# Patient Record
Sex: FEMALE | Race: BLACK OR AFRICAN AMERICAN | URBAN - METROPOLITAN AREA
[De-identification: names, ages, dates, MRNs, and addresses within clinical notes are randomized per-mention and may not be internally consistent; named-entity substitution may affect disease eponyms.]

---

## 2021-12-02 ENCOUNTER — OFFICE VISIT (OUTPATIENT)
Dept: URBAN - METROPOLITAN AREA CLINIC 17 | Facility: CLINIC | Age: 86
End: 2021-12-02
Payer: MEDICARE

## 2021-12-02 ENCOUNTER — LAB OUTSIDE AN ENCOUNTER (OUTPATIENT)
Dept: URBAN - METROPOLITAN AREA CLINIC 17 | Facility: CLINIC | Age: 86
End: 2021-12-02

## 2021-12-02 VITALS
BODY MASS INDEX: 37.73 KG/M2 | DIASTOLIC BLOOD PRESSURE: 75 MMHG | HEART RATE: 79 BPM | SYSTOLIC BLOOD PRESSURE: 147 MMHG | TEMPERATURE: 98.4 F | WEIGHT: 221 LBS | HEIGHT: 64 IN

## 2021-12-02 DIAGNOSIS — K57.30 ACQUIRED DIVERTICULOSIS OF COLON: ICD-10-CM

## 2021-12-02 DIAGNOSIS — I73.9 PAD (PERIPHERAL ARTERY DISEASE): ICD-10-CM

## 2021-12-02 DIAGNOSIS — G47.33 OSA (OBSTRUCTIVE SLEEP APNEA): ICD-10-CM

## 2021-12-02 DIAGNOSIS — I10 ESSENTIAL HYPERTENSION: ICD-10-CM

## 2021-12-02 DIAGNOSIS — R10.13 DYSPEPSIA: ICD-10-CM

## 2021-12-02 DIAGNOSIS — I50.9 HEART FAILURE, UNSPECIFIED HF CHRONICITY, UNSPECIFIED HEART FAILURE TYPE: ICD-10-CM

## 2021-12-02 DIAGNOSIS — I45.10 RBBB: ICD-10-CM

## 2021-12-02 DIAGNOSIS — R54 ADVANCED AGE: ICD-10-CM

## 2021-12-02 PROCEDURE — 99203 OFFICE O/P NEW LOW 30 MIN: CPT | Performed by: INTERNAL MEDICINE

## 2021-12-02 PROCEDURE — 99243 OFF/OP CNSLTJ NEW/EST LOW 30: CPT | Performed by: INTERNAL MEDICINE

## 2021-12-02 RX ORDER — NALOXONE HYDROCHLORIDE 1 MG/ML
INJECTION PARENTERAL
Qty: 4 | Status: ACTIVE | COMMUNITY

## 2021-12-02 RX ORDER — CEPHALEXIN 500 MG/1
CAPSULE ORAL
Qty: 40 | Status: ACTIVE | COMMUNITY

## 2021-12-02 RX ORDER — OLOPATADINE HYDROCHLORIDE 2 MG/ML
SOLUTION OPHTHALMIC
Qty: 7.5 | Status: ACTIVE | COMMUNITY

## 2021-12-02 RX ORDER — TRAZODONE HYDROCHLORIDE 50 MG/1
TABLET ORAL
Qty: 60 | Status: ACTIVE | COMMUNITY

## 2021-12-02 RX ORDER — AMLODIPINE BESYLATE 5 MG/1
TABLET ORAL
Qty: 90 | Status: ACTIVE | COMMUNITY

## 2021-12-02 RX ORDER — CELECOXIB 200 MG/1
CAPSULE ORAL
Qty: 60 | Refills: 0 | Status: ACTIVE | COMMUNITY

## 2021-12-02 RX ORDER — TRAMADOL HYDROCHLORIDE 50 MG/1
TABLET, COATED ORAL
Qty: 60 | Status: ACTIVE | COMMUNITY

## 2021-12-02 RX ORDER — VALSARTAN AND HYDROCHLOROTHIAZIDE 160; 25 MG/1; MG/1
TABLET, FILM COATED ORAL
Qty: 90 | Status: ACTIVE | COMMUNITY

## 2021-12-02 RX ORDER — BUSPIRONE HYDROCHLORIDE 10 MG/1
TABLET ORAL
Qty: 180 | Status: ACTIVE | COMMUNITY

## 2021-12-02 RX ORDER — PANTOPRAZOLE 40 MG/1
TABLET, DELAYED RELEASE ORAL
Qty: 180 | Refills: 0 | Status: ACTIVE | COMMUNITY

## 2021-12-02 RX ORDER — LATANOPROST 50 UG/ML
SOLUTION OPHTHALMIC
Qty: 7.5 | Status: ACTIVE | COMMUNITY

## 2021-12-02 RX ORDER — FUROSEMIDE 40 MG/1
TABLET ORAL
Qty: 90 | Refills: 1 | Status: ACTIVE | COMMUNITY

## 2021-12-02 RX ORDER — CLONAZEPAM 0.5 MG/1
TABLET ORAL
Qty: 60 | Refills: 1 | Status: ACTIVE | COMMUNITY

## 2021-12-02 RX ORDER — ATORVASTATIN CALCIUM 20 MG/1
TABLET, FILM COATED ORAL
Qty: 90 | Status: ACTIVE | COMMUNITY

## 2021-12-02 NOTE — HPI-TODAY'S VISIT:
12/2/21 87 yo pt of Dr Eduardo Kapadia. Referred for "spitting up". A copy of this note will be sent to him.  c/o spiiting up food after eating. "It goes down ok but then at night Im spitting up". This is worse when she eats late.  Symptoms have been ongoing for at least 20 years but worsening as she gets older.  She has a lot of burping at night when she lies down with her CPAP machine and some regurgitation  She has pantoprazole on her med list but she cannot tell me if she takes it or not. Denies heartburn.  Symptoms improve with Tums.  She has a BM qod. She denies constipation. She sometimes takes peptobismol when she has the runs if she eats something that disagrees with her. Unknown if ever smoked

## 2021-12-10 ENCOUNTER — TELEPHONE ENCOUNTER (OUTPATIENT)
Dept: URBAN - METROPOLITAN AREA SURGERY CENTER 30 | Facility: SURGERY CENTER | Age: 86
End: 2021-12-10

## 2022-01-18 ENCOUNTER — WEB ENCOUNTER (OUTPATIENT)
Dept: URBAN - METROPOLITAN AREA CLINIC 17 | Facility: CLINIC | Age: 87
End: 2022-01-18

## 2022-01-18 ENCOUNTER — LAB OUTSIDE AN ENCOUNTER (OUTPATIENT)
Dept: URBAN - METROPOLITAN AREA CLINIC 17 | Facility: CLINIC | Age: 87
End: 2022-01-18

## 2022-01-18 ENCOUNTER — OFFICE VISIT (OUTPATIENT)
Dept: URBAN - METROPOLITAN AREA CLINIC 17 | Facility: CLINIC | Age: 87
End: 2022-01-18
Payer: MEDICARE

## 2022-01-18 VITALS
HEART RATE: 89 BPM | SYSTOLIC BLOOD PRESSURE: 167 MMHG | BODY MASS INDEX: 38.07 KG/M2 | WEIGHT: 223 LBS | TEMPERATURE: 97.3 F | HEIGHT: 64 IN | DIASTOLIC BLOOD PRESSURE: 78 MMHG

## 2022-01-18 DIAGNOSIS — I10 ESSENTIAL HYPERTENSION: ICD-10-CM

## 2022-01-18 DIAGNOSIS — R19.4 CHANGE IN BOWEL HABITS: ICD-10-CM

## 2022-01-18 DIAGNOSIS — I45.10 RBBB: ICD-10-CM

## 2022-01-18 DIAGNOSIS — I73.9 PAD (PERIPHERAL ARTERY DISEASE): ICD-10-CM

## 2022-01-18 DIAGNOSIS — R10.13 DYSPEPSIA: ICD-10-CM

## 2022-01-18 DIAGNOSIS — K57.90 DIVERTICULOSIS: ICD-10-CM

## 2022-01-18 DIAGNOSIS — G47.33 OSA (OBSTRUCTIVE SLEEP APNEA): ICD-10-CM

## 2022-01-18 DIAGNOSIS — I50.9 HEART FAILURE, UNSPECIFIED HF CHRONICITY, UNSPECIFIED HEART FAILURE TYPE: ICD-10-CM

## 2022-01-18 DIAGNOSIS — R54 ADVANCED AGE: ICD-10-CM

## 2022-01-18 DIAGNOSIS — K92.1 BLOOD IN STOOL: ICD-10-CM

## 2022-01-18 PROCEDURE — 99214 OFFICE O/P EST MOD 30 MIN: CPT | Performed by: INTERNAL MEDICINE

## 2022-01-18 RX ORDER — TRAZODONE HYDROCHLORIDE 50 MG/1
TABLET ORAL
Qty: 60 | Status: ACTIVE | COMMUNITY

## 2022-01-18 RX ORDER — CEPHALEXIN 500 MG/1
CAPSULE ORAL
Qty: 40 | Status: ACTIVE | COMMUNITY

## 2022-01-18 RX ORDER — VALSARTAN AND HYDROCHLOROTHIAZIDE 160; 25 MG/1; MG/1
TABLET, FILM COATED ORAL
Qty: 90 | Status: ACTIVE | COMMUNITY

## 2022-01-18 RX ORDER — CELECOXIB 200 MG/1
CAPSULE ORAL
Qty: 60 | Refills: 0 | Status: ACTIVE | COMMUNITY

## 2022-01-18 RX ORDER — BUSPIRONE HYDROCHLORIDE 10 MG/1
TABLET ORAL
Qty: 180 | Status: ACTIVE | COMMUNITY

## 2022-01-18 RX ORDER — TRAMADOL HYDROCHLORIDE 50 MG/1
TABLET, COATED ORAL
Qty: 60 | Status: ACTIVE | COMMUNITY

## 2022-01-18 RX ORDER — OLOPATADINE HYDROCHLORIDE 2 MG/ML
SOLUTION OPHTHALMIC
Qty: 7.5 | Status: ACTIVE | COMMUNITY

## 2022-01-18 RX ORDER — PANTOPRAZOLE 40 MG/1
TABLET, DELAYED RELEASE ORAL
Qty: 180 | Refills: 0 | Status: ACTIVE | COMMUNITY

## 2022-01-18 RX ORDER — LATANOPROST 50 UG/ML
SOLUTION OPHTHALMIC
Qty: 7.5 | Status: ACTIVE | COMMUNITY

## 2022-01-18 RX ORDER — AMLODIPINE BESYLATE 5 MG/1
TABLET ORAL
Qty: 90 | Status: ACTIVE | COMMUNITY

## 2022-01-18 RX ORDER — ATORVASTATIN CALCIUM 20 MG/1
TABLET, FILM COATED ORAL
Qty: 90 | Status: ACTIVE | COMMUNITY

## 2022-01-18 RX ORDER — CLONAZEPAM 0.5 MG/1
TABLET ORAL
Qty: 60 | Refills: 1 | Status: ACTIVE | COMMUNITY

## 2022-01-18 RX ORDER — NALOXONE HYDROCHLORIDE 1 MG/ML
INJECTION PARENTERAL
Qty: 4 | Status: ACTIVE | COMMUNITY

## 2022-01-18 RX ORDER — FUROSEMIDE 40 MG/1
TABLET ORAL
Qty: 90 | Refills: 1 | Status: ACTIVE | COMMUNITY

## 2022-01-18 NOTE — HPI-TODAY'S VISIT:
12/2/21 87 yo pt of Dr Eduardo Kapadia. Referred for "spitting up". A copy of this note will be sent to him.  c/o spiiting up food after eating. "It goes down ok but then at night Im spitting up". This is worse when she eats late.  Symptoms have been ongoing for at least 20 years but worsening as she gets older.  She has a lot of burping at night when she lies down with her CPAP machine and some regurgitation  She has pantoprazole on her med list but she cannot tell me if she takes it or not. Denies heartburn.  Symptoms improve with Tums.  She has a BM qod. She denies constipation. She sometimes takes peptobismol when she has the runs if she eats something that disagrees with her.   1/18/22 Here with daughter Says she is no longer spitting up except when she eats late at night. IF she does not eat late at night this does not happen. This has been for many years.  Discussed barium swallow.  Has a new problem today. SAys when she has a loose BM she will usually have a smear of blood in her stool. Says diarrhea occurs about a few times a week "I will go multiple times" On days w/o diarrhea, she will have either formed stool or be constipated. "I dont have a BM daily".  The first time she saw blood stool was over 10 years ago. She had a colonoscopy, she thinks about 5 years ago. She thinks it was in [place]. "it came out ok". No fhx of colon CA.

## 2022-03-10 ENCOUNTER — OFFICE VISIT (OUTPATIENT)
Dept: URBAN - METROPOLITAN AREA CLINIC 17 | Facility: CLINIC | Age: 87
End: 2022-03-10
Payer: MEDICARE

## 2022-03-10 VITALS
BODY MASS INDEX: 37.73 KG/M2 | SYSTOLIC BLOOD PRESSURE: 142 MMHG | HEART RATE: 80 BPM | TEMPERATURE: 98.2 F | WEIGHT: 221 LBS | HEIGHT: 64 IN | DIASTOLIC BLOOD PRESSURE: 77 MMHG

## 2022-03-10 DIAGNOSIS — R54 ADVANCED AGE: ICD-10-CM

## 2022-03-10 DIAGNOSIS — I45.10 RBBB: ICD-10-CM

## 2022-03-10 DIAGNOSIS — I73.9 PAD (PERIPHERAL ARTERY DISEASE): ICD-10-CM

## 2022-03-10 DIAGNOSIS — I50.9 HEART FAILURE, UNSPECIFIED HF CHRONICITY, UNSPECIFIED HEART FAILURE TYPE: ICD-10-CM

## 2022-03-10 DIAGNOSIS — R19.4 CHANGE IN BOWEL HABITS: ICD-10-CM

## 2022-03-10 DIAGNOSIS — G47.33 OSA (OBSTRUCTIVE SLEEP APNEA): ICD-10-CM

## 2022-03-10 DIAGNOSIS — K92.1 BLOOD IN STOOL: ICD-10-CM

## 2022-03-10 DIAGNOSIS — I10 ESSENTIAL HYPERTENSION: ICD-10-CM

## 2022-03-10 DIAGNOSIS — K57.90 DIVERTICULOSIS: ICD-10-CM

## 2022-03-10 DIAGNOSIS — R10.13 DYSPEPSIA: ICD-10-CM

## 2022-03-10 PROBLEM — 78275009: Status: ACTIVE | Noted: 2021-12-02

## 2022-03-10 PROBLEM — 59621000: Status: ACTIVE | Noted: 2021-12-02

## 2022-03-10 PROBLEM — 397881000: Status: ACTIVE | Noted: 2021-12-02

## 2022-03-10 PROBLEM — 840580004: Status: ACTIVE | Noted: 2021-12-02

## 2022-03-10 PROBLEM — 84114007: Status: ACTIVE | Noted: 2021-12-02

## 2022-03-10 PROBLEM — 49808004: Status: ACTIVE | Noted: 2021-12-02

## 2022-03-10 PROBLEM — 59118001: Status: ACTIVE | Noted: 2021-12-02

## 2022-03-10 PROCEDURE — 99213 OFFICE O/P EST LOW 20 MIN: CPT | Performed by: INTERNAL MEDICINE

## 2022-03-10 RX ORDER — OLOPATADINE HYDROCHLORIDE 2 MG/ML
SOLUTION OPHTHALMIC
Qty: 7.5 | Status: ACTIVE | COMMUNITY

## 2022-03-10 RX ORDER — ATORVASTATIN CALCIUM 20 MG/1
TABLET, FILM COATED ORAL
Qty: 90 | Status: ACTIVE | COMMUNITY

## 2022-03-10 RX ORDER — CLONAZEPAM 0.5 MG/1
TABLET ORAL
Qty: 60 | Refills: 1 | Status: ACTIVE | COMMUNITY

## 2022-03-10 RX ORDER — TRAZODONE HYDROCHLORIDE 50 MG/1
TABLET ORAL
Qty: 60 | Status: ACTIVE | COMMUNITY

## 2022-03-10 RX ORDER — VALSARTAN AND HYDROCHLOROTHIAZIDE 160; 25 MG/1; MG/1
TABLET, FILM COATED ORAL
Qty: 90 | Status: ACTIVE | COMMUNITY

## 2022-03-10 RX ORDER — LATANOPROST 50 UG/ML
SOLUTION OPHTHALMIC
Qty: 7.5 | Status: ACTIVE | COMMUNITY

## 2022-03-10 RX ORDER — CELECOXIB 200 MG/1
CAPSULE ORAL
Qty: 60 | Refills: 0 | Status: ACTIVE | COMMUNITY

## 2022-03-10 RX ORDER — FUROSEMIDE 40 MG/1
TABLET ORAL
Qty: 90 | Refills: 1 | Status: ACTIVE | COMMUNITY

## 2022-03-10 RX ORDER — AMLODIPINE BESYLATE 5 MG/1
TABLET ORAL
Qty: 90 | Status: ACTIVE | COMMUNITY

## 2022-03-10 RX ORDER — TRAMADOL HYDROCHLORIDE 50 MG/1
TABLET, COATED ORAL
Qty: 60 | Status: ACTIVE | COMMUNITY

## 2022-03-10 RX ORDER — BUSPIRONE HYDROCHLORIDE 10 MG/1
TABLET ORAL
Qty: 180 | Status: ACTIVE | COMMUNITY

## 2022-03-10 RX ORDER — CEPHALEXIN 500 MG/1
CAPSULE ORAL
Qty: 40 | Status: ACTIVE | COMMUNITY

## 2022-03-10 RX ORDER — NALOXONE HYDROCHLORIDE 1 MG/ML
INJECTION PARENTERAL
Qty: 4 | Status: ACTIVE | COMMUNITY

## 2022-03-10 RX ORDER — PANTOPRAZOLE 40 MG/1
TABLET, DELAYED RELEASE ORAL
Qty: 180 | Refills: 0 | Status: ACTIVE | COMMUNITY

## 2022-03-10 NOTE — HPI-TODAY'S VISIT:
12/2/21 87 yo pt of Dr Eduardo Kapadia. Referred for "spitting up". A copy of this note will be sent to him.  c/o spiiting up food after eating. "It goes down ok but then at night Im spitting up". This is worse when she eats late.  Symptoms have been ongoing for at least 20 years but worsening as she gets older.  She has a lot of burping at night when she lies down with her CPAP machine and some regurgitation  She has pantoprazole on her med list but she cannot tell me if she takes it or not. Denies heartburn.  Symptoms improve with Tums.  She has a BM qod. She denies constipation. She sometimes takes peptobismol when she has the runs if she eats something that disagrees with her.   1/18/22 Here with daughter Says she is no longer spitting up except when she eats late at night. IF she does not eat late at night this does not happen. This has been for many years.  Discussed barium swallow.  Has a new problem today. SAys when she has a loose BM she will usually have a smear of blood in her stool. Says diarrhea occurs about a few times a week "I will go multiple times" On days w/o diarrhea, she will have either formed stool or be constipated. "I dont have a BM daily".  The first time she saw blood stool was over 10 years ago. She had a colonoscopy, she thinks about 5 years ago. She thinks it was in [place]. "it came out ok". No fhx of colon CA.   3/10/22 Here for f.u visit with daughter  No changes in symptoms/  did not listen to KUB results left on vm Discussed today.   No more blood in stool.

## 2022-04-06 ENCOUNTER — DASHBOARD ENCOUNTERS (OUTPATIENT)
Age: 87
End: 2022-04-06

## 2022-05-17 ENCOUNTER — OFFICE VISIT (OUTPATIENT)
Dept: URBAN - METROPOLITAN AREA CLINIC 17 | Facility: CLINIC | Age: 87
End: 2022-05-17

## 2022-05-17 RX ORDER — CELECOXIB 200 MG/1
CAPSULE ORAL
Qty: 60 | Refills: 0 | Status: ACTIVE | COMMUNITY

## 2022-05-17 RX ORDER — NALOXONE HYDROCHLORIDE 1 MG/ML
INJECTION PARENTERAL
Qty: 4 | Status: ACTIVE | COMMUNITY

## 2022-05-17 RX ORDER — LATANOPROST 50 UG/ML
SOLUTION OPHTHALMIC
Qty: 7.5 | Status: ACTIVE | COMMUNITY

## 2022-05-17 RX ORDER — ATORVASTATIN CALCIUM 20 MG/1
TABLET, FILM COATED ORAL
Qty: 90 | Status: ACTIVE | COMMUNITY

## 2022-05-17 RX ORDER — AMLODIPINE BESYLATE 5 MG/1
TABLET ORAL
Qty: 90 | Status: ACTIVE | COMMUNITY

## 2022-05-17 RX ORDER — PANTOPRAZOLE 40 MG/1
TABLET, DELAYED RELEASE ORAL
Qty: 180 | Refills: 0 | Status: ACTIVE | COMMUNITY

## 2022-05-17 RX ORDER — CLONAZEPAM 0.5 MG/1
TABLET ORAL
Qty: 60 | Refills: 1 | Status: ACTIVE | COMMUNITY

## 2022-05-17 RX ORDER — TRAZODONE HYDROCHLORIDE 50 MG/1
TABLET ORAL
Qty: 60 | Status: ACTIVE | COMMUNITY

## 2022-05-17 RX ORDER — BUSPIRONE HYDROCHLORIDE 10 MG/1
TABLET ORAL
Qty: 180 | Status: ACTIVE | COMMUNITY

## 2022-05-17 RX ORDER — FUROSEMIDE 40 MG/1
TABLET ORAL
Qty: 90 | Refills: 1 | Status: ACTIVE | COMMUNITY

## 2022-05-17 RX ORDER — TRAMADOL HYDROCHLORIDE 50 MG/1
TABLET, COATED ORAL
Qty: 60 | Status: ACTIVE | COMMUNITY

## 2022-05-17 RX ORDER — VALSARTAN AND HYDROCHLOROTHIAZIDE 160; 25 MG/1; MG/1
TABLET, FILM COATED ORAL
Qty: 90 | Status: ACTIVE | COMMUNITY

## 2022-05-17 RX ORDER — OLOPATADINE HYDROCHLORIDE 2 MG/ML
SOLUTION OPHTHALMIC
Qty: 7.5 | Status: ACTIVE | COMMUNITY

## 2022-05-17 RX ORDER — CEPHALEXIN 500 MG/1
CAPSULE ORAL
Qty: 40 | Status: ACTIVE | COMMUNITY